# Patient Record
Sex: MALE | Race: WHITE | Employment: OTHER | URBAN - METROPOLITAN AREA
[De-identification: names, ages, dates, MRNs, and addresses within clinical notes are randomized per-mention and may not be internally consistent; named-entity substitution may affect disease eponyms.]

---

## 2020-04-28 ENCOUNTER — HOSPITAL ENCOUNTER (OUTPATIENT)
Dept: LAB | Age: 85
Discharge: HOME | End: 2020-04-28
Payer: MEDICARE

## 2020-04-28 LAB — SENTARA SPECIMEN COL,SENBCF: NORMAL

## 2020-04-28 PROCEDURE — 99001 SPECIMEN HANDLING PT-LAB: CPT

## 2021-03-15 ENCOUNTER — APPOINTMENT (OUTPATIENT)
Dept: GENERAL RADIOLOGY | Age: 86
End: 2021-03-15
Attending: PHYSICIAN ASSISTANT
Payer: MEDICARE

## 2021-03-15 ENCOUNTER — HOSPITAL ENCOUNTER (EMERGENCY)
Age: 86
Discharge: HOME | End: 2021-03-15
Attending: EMERGENCY MEDICINE
Payer: MEDICARE

## 2021-03-15 VITALS
HEIGHT: 70 IN | SYSTOLIC BLOOD PRESSURE: 184 MMHG | HEART RATE: 70 BPM | TEMPERATURE: 97.7 F | RESPIRATION RATE: 18 BRPM | WEIGHT: 162 LBS | DIASTOLIC BLOOD PRESSURE: 82 MMHG | OXYGEN SATURATION: 99 % | BODY MASS INDEX: 23.19 KG/M2

## 2021-03-15 DIAGNOSIS — G89.29 CHRONIC DENTAL PAIN: Primary | ICD-10-CM

## 2021-03-15 DIAGNOSIS — N30.00 ACUTE CYSTITIS WITHOUT HEMATURIA: ICD-10-CM

## 2021-03-15 DIAGNOSIS — K08.9 CHRONIC DENTAL PAIN: Primary | ICD-10-CM

## 2021-03-15 DIAGNOSIS — N18.9 CHRONIC KIDNEY DISEASE, UNSPECIFIED CKD STAGE: ICD-10-CM

## 2021-03-15 LAB
ALBUMIN SERPL-MCNC: 3.9 G/DL (ref 3.4–5)
ALBUMIN/GLOB SERPL: 1.1 {RATIO} (ref 0.8–1.7)
ALP SERPL-CCNC: 58 U/L (ref 45–117)
ALT SERPL-CCNC: 17 U/L (ref 16–61)
ANION GAP SERPL CALC-SCNC: 7 MMOL/L (ref 3–18)
APPEARANCE UR: ABNORMAL
AST SERPL-CCNC: 13 U/L (ref 10–38)
BACTERIA URNS QL MICRO: ABNORMAL /HPF
BASOPHILS # BLD: 0 K/UL (ref 0–0.1)
BASOPHILS NFR BLD: 0 % (ref 0–2)
BILIRUB SERPL-MCNC: 0.4 MG/DL (ref 0.2–1)
BILIRUB UR QL: NEGATIVE
BUN SERPL-MCNC: 53 MG/DL (ref 7–18)
BUN/CREAT SERPL: 22 (ref 12–20)
CALCIUM SERPL-MCNC: 9 MG/DL (ref 8.5–10.1)
CHLORIDE SERPL-SCNC: 105 MMOL/L (ref 100–111)
CK MB CFR SERPL CALC: 6.3 % (ref 0–4)
CK MB SERPL-MCNC: 3.4 NG/ML (ref 5–25)
CK SERPL-CCNC: 54 U/L (ref 39–308)
CO2 SERPL-SCNC: 27 MMOL/L (ref 21–32)
COLOR UR: YELLOW
CREAT SERPL-MCNC: 2.37 MG/DL (ref 0.6–1.3)
DIFFERENTIAL METHOD BLD: ABNORMAL
EOSINOPHIL # BLD: 0.1 K/UL (ref 0–0.4)
EOSINOPHIL NFR BLD: 2 % (ref 0–5)
EPITH CASTS URNS QL MICRO: ABNORMAL /LPF (ref 0–5)
ERYTHROCYTE [DISTWIDTH] IN BLOOD BY AUTOMATED COUNT: 13.6 % (ref 11.6–14.5)
GLOBULIN SER CALC-MCNC: 3.4 G/DL (ref 2–4)
GLUCOSE SERPL-MCNC: 104 MG/DL (ref 74–99)
GLUCOSE UR STRIP.AUTO-MCNC: NEGATIVE MG/DL
HCT VFR BLD AUTO: 37.5 % (ref 36–48)
HGB BLD-MCNC: 12.2 G/DL (ref 13–16)
HGB UR QL STRIP: ABNORMAL
KETONES UR QL STRIP.AUTO: NEGATIVE MG/DL
LEUKOCYTE ESTERASE UR QL STRIP.AUTO: ABNORMAL
LIPASE SERPL-CCNC: 155 U/L (ref 73–393)
LYMPHOCYTES # BLD: 1.6 K/UL (ref 0.9–3.6)
LYMPHOCYTES NFR BLD: 29 % (ref 21–52)
MCH RBC QN AUTO: 32.5 PG (ref 24–34)
MCHC RBC AUTO-ENTMCNC: 32.5 G/DL (ref 31–37)
MCV RBC AUTO: 100 FL (ref 74–97)
MONOCYTES # BLD: 0.6 K/UL (ref 0.05–1.2)
MONOCYTES NFR BLD: 11 % (ref 3–10)
NEUTS SEG # BLD: 3.3 K/UL (ref 1.8–8)
NEUTS SEG NFR BLD: 58 % (ref 40–73)
NITRITE UR QL STRIP.AUTO: NEGATIVE
PH UR STRIP: 8 [PH] (ref 5–8)
PLATELET # BLD AUTO: 248 K/UL (ref 135–420)
PMV BLD AUTO: 9.9 FL (ref 9.2–11.8)
POTASSIUM SERPL-SCNC: 4 MMOL/L (ref 3.5–5.5)
PROT SERPL-MCNC: 7.3 G/DL (ref 6.4–8.2)
PROT UR STRIP-MCNC: NEGATIVE MG/DL
RBC # BLD AUTO: 3.75 M/UL (ref 4.7–5.5)
RBC #/AREA URNS HPF: ABNORMAL /HPF (ref 0–5)
SODIUM SERPL-SCNC: 139 MMOL/L (ref 136–145)
SP GR UR REFRACTOMETRY: 1.01 (ref 1–1.03)
TRI-PHOS CRY URNS QL MICRO: ABNORMAL
TROPONIN I SERPL-MCNC: <0.02 NG/ML (ref 0–0.04)
UROBILINOGEN UR QL STRIP.AUTO: 0.2 EU/DL (ref 0.2–1)
WBC # BLD AUTO: 5.6 K/UL (ref 4.6–13.2)
WBC URNS QL MICRO: ABNORMAL /HPF (ref 0–5)

## 2021-03-15 PROCEDURE — 71101 X-RAY EXAM UNILAT RIBS/CHEST: CPT

## 2021-03-15 PROCEDURE — 74011250637 HC RX REV CODE- 250/637: Performed by: PHYSICIAN ASSISTANT

## 2021-03-15 PROCEDURE — 87086 URINE CULTURE/COLONY COUNT: CPT

## 2021-03-15 PROCEDURE — 93005 ELECTROCARDIOGRAM TRACING: CPT

## 2021-03-15 PROCEDURE — 81001 URINALYSIS AUTO W/SCOPE: CPT

## 2021-03-15 PROCEDURE — 96360 HYDRATION IV INFUSION INIT: CPT

## 2021-03-15 PROCEDURE — 82553 CREATINE MB FRACTION: CPT

## 2021-03-15 PROCEDURE — 87077 CULTURE AEROBIC IDENTIFY: CPT

## 2021-03-15 PROCEDURE — 99284 EMERGENCY DEPT VISIT MOD MDM: CPT

## 2021-03-15 PROCEDURE — 80053 COMPREHEN METABOLIC PANEL: CPT

## 2021-03-15 PROCEDURE — 83690 ASSAY OF LIPASE: CPT

## 2021-03-15 PROCEDURE — 87186 SC STD MICRODIL/AGAR DIL: CPT

## 2021-03-15 PROCEDURE — 85025 COMPLETE CBC W/AUTO DIFF WBC: CPT

## 2021-03-15 PROCEDURE — 74011250636 HC RX REV CODE- 250/636: Performed by: PHYSICIAN ASSISTANT

## 2021-03-15 RX ORDER — DOXYCYCLINE HYCLATE 100 MG
100 TABLET ORAL 2 TIMES DAILY
Qty: 28 TAB | Refills: 0 | Status: SHIPPED | OUTPATIENT
Start: 2021-03-15 | End: 2021-03-29

## 2021-03-15 RX ORDER — ACETAMINOPHEN 500 MG
1000 TABLET ORAL
Status: COMPLETED | OUTPATIENT
Start: 2021-03-15 | End: 2021-03-15

## 2021-03-15 RX ADMIN — ACETAMINOPHEN 1000 MG: 500 TABLET, FILM COATED ORAL at 15:05

## 2021-03-15 RX ADMIN — SODIUM CHLORIDE 500 ML: 900 INJECTION, SOLUTION INTRAVENOUS at 16:26

## 2021-03-15 NOTE — ED PROVIDER NOTES
EMERGENCY DEPARTMENT HISTORY AND PHYSICAL EXAM    Date: 3/15/2021  Patient Name: Hector Kenney    History of Presenting Illness     Chief Complaint   Patient presents with    Dental Pain         History Provided By: Patient    Chief Complaint: dental pain    HPI(Context):   1:47 PM  Hector Kenney is a 80 y.o. male with PMHX of HTN, PVD, pacemaker, on Eliquis, who presents to the emergency department C/O right sided dental/mouth pain. Sxs x since may 2020. Pt state reports \"skin\" in his mouth with right upper dental pain. Pt attempted to see PCP today but no appts available. Pt state he has seen multiple dentists, ENT, and periodontist with no formal dx. PT had CT face ordered by PCP which showed no acute process. Pt reports hx of bridge and dental implant >20 years ago. Wife expresses concern for pt as he complains about his dental pain regularly. Pt reports no relief with various Rx mouth washes. Pt denies fever, chills, chest pain, nausea, vomiting, abdominal pain, and any other sxs or complaints. Pt is nonsmoker. PCP: Santos Sweet MD    Current Outpatient Medications   Medication Sig Dispense Refill    doxycycline (VIBRA-TABS) 100 mg tablet Take 1 Tab by mouth two (2) times a day for 14 days. Indications: inflammation of the tissue lining the sinuses 28 Tab 0    linezolid (ZYVOX) 600 mg tablet Take 1 Tab by mouth two (2) times a day. 14 Tab 0    tamsulosin (FLOMAX) 0.4 mg capsule Take 1 Cap by mouth daily. 90 Cap 3    Cetirizine 10 mg cap Take  by mouth.  ELIQUIS 2.5 mg tablet   5    felodipine (PLENDIL SR) 10 mg 24 hr tablet Take 10 mg by mouth daily.  aspirin 81 mg chewable tablet Take 81 mg by mouth daily.  FLUVASTATIN SODIUM (FLUVASTATIN PO) Take  by mouth.  metoprolol succinate (TOPROL-XL) 100 mg XL tablet Take 100 mg by mouth daily.  furosemide (LASIX) 20 mg tablet Take  by mouth daily.  ezetimibe (ZETIA) 10 mg tablet Take  by mouth.       isosorbide mononitrate ER (IMDUR) 60 mg CR tablet Take  by mouth every morning.  nitroglycerin (NITROSTAT) 0.3 mg SL tablet by SubLINGual route every five (5) minutes as needed.  ALPRAZolam (XANAX) 0.5 mg tablet Take  by mouth.  omega-3 fatty acids-vitamin e (FISH OIL) 1,000 mg Cap Take 1 Cap by mouth. Past History     Past Medical History:  Past Medical History:   Diagnosis Date    Burning with urination     Cataract     Essential hypertension     Heart abnormalities     Heart block     Peripheral vascular disease (HCC)     Stented coronary artery     Unspecified cerebral artery occlusion with cerebral infarction        Past Surgical History:  Past Surgical History:   Procedure Laterality Date    HX CAROTID ENDARTERECTOMY      HX CATARACT REMOVAL      HX CORONARY STENT PLACEMENT      HX ORTHOPAEDIC      HX OTHER SURGICAL      liver biopsy    HX PACEMAKER         Family History:  Family History   Problem Relation Age of Onset    Cancer Father        Social History:  Social History     Tobacco Use    Smoking status: Former Smoker     Quit date: 1991     Years since quittin.2    Smokeless tobacco: Never Used   Substance Use Topics    Alcohol use: Yes     Comment: rarely    Drug use: No       Allergies: Allergies   Allergen Reactions    Cefuroxime Axetil Rash    Ciprofloxacin Unknown (comments)    Dicloxacillin Rash    Statins-Hmg-Coa Reductase Inhibitors Myalgia         Review of Systems   Review of Systems   Constitutional: Negative for chills and fever. HENT: Positive for congestion, dental problem and sinus pain. Respiratory: Negative for shortness of breath. Gastrointestinal: Negative for abdominal pain, nausea and vomiting. Genitourinary: Negative for dysuria. Neurological: Negative for headaches. All other systems reviewed and are negative.       Physical Exam     Vitals:    03/15/21 1335   BP: (!) 184/82   Pulse: 70   Resp: 18   Temp: 97.7 °F (36.5 °C)   SpO2: 99%   Weight: 73.5 kg (162 lb)   Height: 5' 10\" (1.778 m)     Physical Exam  Vitals signs and nursing note reviewed. Constitutional:       General: He is not in acute distress. Appearance: He is well-developed. He is not diaphoretic. Comments: Geriatric  male in NAD. Alert. Wife at bedside. HENT:      Head: Normocephalic and atraumatic. No right periorbital erythema or left periorbital erythema. Jaw: No trismus. Right Ear: External ear normal. No drainage or swelling. Tympanic membrane is not perforated, erythematous or bulging. Left Ear: External ear normal. No drainage or swelling. Tympanic membrane is not perforated, erythematous or bulging. Nose: Congestion present. No rhinorrhea. Right Sinus: Maxillary sinus tenderness present. No frontal sinus tenderness. Left Sinus: Maxillary sinus tenderness present. No frontal sinus tenderness. Mouth/Throat:      Mouth: Mucous membranes are dry. No oral lesions. Dentition: Dental tenderness and dental caries present. No gingival swelling, dental abscesses or gum lesions. Palate: No mass and lesions. Pharynx: Uvula midline. No oropharyngeal exudate, posterior oropharyngeal erythema or uvula swelling. Tonsils: No tonsillar abscesses. Eyes:      General: No scleral icterus. Right eye: No discharge. Left eye: No discharge. Conjunctiva/sclera: Conjunctivae normal.   Neck:      Musculoskeletal: Normal range of motion and neck supple. Cardiovascular:      Rate and Rhythm: Normal rate and regular rhythm. Heart sounds: Normal heart sounds. No murmur. No friction rub. No gallop. Pulmonary:      Effort: Pulmonary effort is normal. No tachypnea, accessory muscle usage or respiratory distress. Breath sounds: Normal breath sounds. No decreased breath sounds, wheezing, rhonchi or rales. Abdominal:      General: There is no distension.       Palpations: Abdomen is soft. Tenderness: There is no abdominal tenderness. Musculoskeletal: Normal range of motion. General: No tenderness. Right lower leg: Edema (1+) present. Left lower leg: Edema (1+) present. Lymphadenopathy:      Cervical: No cervical adenopathy. Skin:     General: Skin is warm and dry. Neurological:      Mental Status: He is alert and oriented to person, place, and time. Mental status is at baseline. Psychiatric:         Behavior: Behavior normal.         Judgment: Judgment normal.             Diagnostic Study Results     Labs -     Recent Results (from the past 12 hour(s))   EKG, 12 LEAD, INITIAL    Collection Time: 03/15/21  3:11 PM   Result Value Ref Range    Ventricular Rate 72 BPM    Atrial Rate 72 BPM    P-R Interval 224 ms    QRS Duration 188 ms    Q-T Interval 478 ms    QTC Calculation (Bezet) 523 ms    Calculated R Axis -76 degrees    Calculated T Axis 97 degrees    Diagnosis       Electronic ventricular pacemaker  When compared with ECG of 01-MAY-2013 02:15,  Electronic ventricular pacemaker has replaced Sinus rhythm     CBC WITH AUTOMATED DIFF    Collection Time: 03/15/21  3:13 PM   Result Value Ref Range    WBC 5.6 4.6 - 13.2 K/uL    RBC 3.75 (L) 4.70 - 5.50 M/uL    HGB 12.2 (L) 13.0 - 16.0 g/dL    HCT 37.5 36.0 - 48.0 %    .0 (H) 74.0 - 97.0 FL    MCH 32.5 24.0 - 34.0 PG    MCHC 32.5 31.0 - 37.0 g/dL    RDW 13.6 11.6 - 14.5 %    PLATELET 823 609 - 832 K/uL    MPV 9.9 9.2 - 11.8 FL    NEUTROPHILS 58 40 - 73 %    LYMPHOCYTES 29 21 - 52 %    MONOCYTES 11 (H) 3 - 10 %    EOSINOPHILS 2 0 - 5 %    BASOPHILS 0 0 - 2 %    ABS. NEUTROPHILS 3.3 1.8 - 8.0 K/UL    ABS. LYMPHOCYTES 1.6 0.9 - 3.6 K/UL    ABS. MONOCYTES 0.6 0.05 - 1.2 K/UL    ABS. EOSINOPHILS 0.1 0.0 - 0.4 K/UL    ABS.  BASOPHILS 0.0 0.0 - 0.1 K/UL    DF AUTOMATED     METABOLIC PANEL, COMPREHENSIVE    Collection Time: 03/15/21  3:13 PM   Result Value Ref Range    Sodium 139 136 - 145 mmol/L    Potassium 4.0 3.5 - 5.5 mmol/L    Chloride 105 100 - 111 mmol/L    CO2 27 21 - 32 mmol/L    Anion gap 7 3.0 - 18 mmol/L    Glucose 104 (H) 74 - 99 mg/dL    BUN 53 (H) 7.0 - 18 MG/DL    Creatinine 2.37 (H) 0.6 - 1.3 MG/DL    BUN/Creatinine ratio 22 (H) 12 - 20      GFR est AA 31 (L) >60 ml/min/1.73m2    GFR est non-AA 26 (L) >60 ml/min/1.73m2    Calcium 9.0 8.5 - 10.1 MG/DL    Bilirubin, total 0.4 0.2 - 1.0 MG/DL    ALT (SGPT) 17 16 - 61 U/L    AST (SGOT) 13 10 - 38 U/L    Alk. phosphatase 58 45 - 117 U/L    Protein, total 7.3 6.4 - 8.2 g/dL    Albumin 3.9 3.4 - 5.0 g/dL    Globulin 3.4 2.0 - 4.0 g/dL    A-G Ratio 1.1 0.8 - 1.7     CARDIAC PANEL,(CK, CKMB & TROPONIN)    Collection Time: 03/15/21  3:13 PM   Result Value Ref Range    CK - MB 3.4 <3.6 ng/ml    CK-MB Index 6.3 (H) 0.0 - 4.0 %    CK 54 39 - 308 U/L    Troponin-I, QT <0.02 0.0 - 0.045 NG/ML   LIPASE    Collection Time: 03/15/21  3:13 PM   Result Value Ref Range    Lipase 155 73 - 393 U/L   URINALYSIS W/ RFLX MICROSCOPIC    Collection Time: 03/15/21  3:39 PM   Result Value Ref Range    Color YELLOW      Appearance CLOUDY      Specific gravity 1.008 1.005 - 1.030      pH (UA) 8.0 5.0 - 8.0      Protein Negative NEG mg/dL    Glucose Negative NEG mg/dL    Ketone Negative NEG mg/dL    Bilirubin Negative NEG      Blood TRACE (A) NEG      Urobilinogen 0.2 0.2 - 1.0 EU/dL    Nitrites Negative NEG      Leukocyte Esterase LARGE (A) NEG     URINE MICROSCOPIC ONLY    Collection Time: 03/15/21  3:39 PM   Result Value Ref Range    WBC 21 to 35 0 - 5 /hpf    RBC 0 to 3 0 - 5 /hpf    Epithelial cells FEW 0 - 5 /lpf    Bacteria 3+ (A) NEG /hpf    Triple Phosphate crystals FEW (A) NEG         XR RIBS LT W PA CXR MIN 3 V   Final Result         1. No acute radiographic cardiopulmonary abnormality. 2. No displaced left-sided rib fracture.         CT Results  (Last 48 hours)    None        CXR Results  (Last 48 hours)    None          Medications given in the ED-  Medications acetaminophen (TYLENOL) tablet 1,000 mg (1,000 mg Oral Given 3/15/21 6775)   sodium chloride 0.9 % bolus infusion 500 mL (0 mL IntraVENous IV Completed 3/15/21 1726)         Medical Decision Making   I am the first provider for this patient. I reviewed the vital signs, available nursing notes, past medical history, past surgical history, family history and social history. Vital Signs-Reviewed the patient's vital signs. Pulse Oximetry Analysis - 99% on RA. NORMAL     Records Reviewed: Nursing Notes, Old Medical Records, Previous Radiology Studies and Previous Laboratory Studies    Provider Notes (Medical Decision Making): dental caries, implant/bridge problem, malignancy, chronic pain    Procedures:  Procedures    ED Course:   1:47 PM Initial assessment performed. The patients presenting problems have been discussed, and they are in agreement with the care plan formulated and outlined with them. I have encouraged them to ask questions as they arise throughout their visit. Diagnosis and Disposition       Workup reassuring today. CKD with mildly worsening today but not far from baseline. Gave small volume IVF in ED. UA c/w UTI with hx of same. Most recent urine cultures reveal Enterococcus. As pt has numerous allergies and CKD, discussed ABX choice with attending. Will place on doxy and await urine culture. No evidence of upper urinary tract involvement. No abdominal pain complaints. Pt endorses sinus pressure and congestion so possible dental pain due to sinusitis which doxy will cover. Will give oral surgery FU as pt has dental bridge/implant and he expresses concern for problem with his dental device. Reviewed case and labs with attending    1. Chronic dental pain    2. Acute cystitis without hematuria    3. Chronic kidney disease, unspecified CKD stage        PLAN:  1. D/C Home  2.    Discharge Medication List as of 3/15/2021  5:23 PM      START taking these medications    Details   doxycycline (VIBRA-TABS) 100 mg tablet Take 1 Tab by mouth two (2) times a day for 14 days. Indications: inflammation of the tissue lining the sinuses, Normal, Disp-28 Tab, R-0         CONTINUE these medications which have NOT CHANGED    Details   linezolid (ZYVOX) 600 mg tablet Take 1 Tab by mouth two (2) times a day., Print, Disp-14 Tab, R-0      tamsulosin (FLOMAX) 0.4 mg capsule Take 1 Cap by mouth daily. , Normal, Disp-90 Cap, R-3      Cetirizine 10 mg cap Take  by mouth., Historical Med      ELIQUIS 2.5 mg tablet Historical Med, R-5      felodipine (PLENDIL SR) 10 mg 24 hr tablet Take 10 mg by mouth daily. , Historical Med      aspirin 81 mg chewable tablet Take 81 mg by mouth daily. , Historical Med      FLUVASTATIN SODIUM (FLUVASTATIN PO) Take  by mouth., Historical Med      metoprolol succinate (TOPROL-XL) 100 mg XL tablet Take 100 mg by mouth daily. , Historical Med      furosemide (LASIX) 20 mg tablet Take  by mouth daily. , Historical Med      ezetimibe (ZETIA) 10 mg tablet Take  by mouth., Historical Med      isosorbide mononitrate ER (IMDUR) 60 mg CR tablet Take  by mouth every morning., Historical Med      nitroglycerin (NITROSTAT) 0.3 mg SL tablet by SubLINGual route every five (5) minutes as needed.  , Historical Med      ALPRAZolam (XANAX) 0.5 mg tablet Take  by mouth.  , Historical Med      omega-3 fatty acids-vitamin e (FISH OIL) 1,000 mg Cap Take 1 Cap by mouth.  , Historical Med           3. Follow-up Information     Follow up With Specialties Details Why Contact Info    Willem Peña MD Oral Surgery  please follow up with oral surgery [address]  [phone]      Denise Christian MD Internal Medicine   [address] [place] [address] [address] [facility]      [facility] EMERGENCY DEPT Emergency Medicine   [address] [facility] [address]  [phone]        _______________________________    Attestations:   This note is prepared by Estevan Ybarra BRENNA.      Please note that this dictation was completed with [employer] MD, the computer voice recognition software. Quite often unanticipated grammatical, syntax, homophones, and other interpretive errors are inadvertently transcribed by the computer software. Please disregard these errors. Please excuse any errors that have escaped final proofreading.

## 2021-03-15 NOTE — ED TRIAGE NOTES
Patient reports problems with his gums. States he's been to 5 dentists and his doctor and noone listens.

## 2021-03-18 LAB
ATRIAL RATE: 72 BPM
BACTERIA SPEC CULT: ABNORMAL
CALCULATED R AXIS, ECG10: -76 DEGREES
CALCULATED T AXIS, ECG11: 97 DEGREES
CC UR VC: ABNORMAL
DIAGNOSIS, 93000: NORMAL
P-R INTERVAL, ECG05: 224 MS
Q-T INTERVAL, ECG07: 478 MS
QRS DURATION, ECG06: 188 MS
QTC CALCULATION (BEZET), ECG08: 523 MS
SERVICE CMNT-IMP: ABNORMAL
VENTRICULAR RATE, ECG03: 72 BPM

## 2021-03-19 RX ORDER — AMPICILLIN 500 MG/1
500 CAPSULE ORAL
Qty: 20 CAP | Refills: 0 | Status: SHIPPED | OUTPATIENT
Start: 2021-03-19 | End: 2021-03-24

## 2021-03-19 RX ORDER — SULFAMETHOXAZOLE AND TRIMETHOPRIM 800; 160 MG/1; MG/1
1 TABLET ORAL 2 TIMES DAILY
Qty: 6 TAB | Refills: 0 | Status: SHIPPED | OUTPATIENT
Start: 2021-03-19 | End: 2021-03-19 | Stop reason: CLARIF

## 2021-03-19 NOTE — CALL BACK NOTE
4:06 PM 
03/19/21 Pt Returned call. Discussed urine culture results. Patient states he has had penicillin in the past and had no reaction. Will send prescription for ampicillin and have patient follow-up with his doctor. no renal adjustment needed Yesi Fernandes PA-C

## 2021-03-19 NOTE — CALL BACK NOTE
3:54 PM 
03/19/21 Attempted to contact patient regarding urine culture results. No answer. Message left on machine to return call.   
 
 
Daine Severe, PA-C

## 2022-09-18 ENCOUNTER — APPOINTMENT (OUTPATIENT)
Dept: GENERAL RADIOLOGY | Age: 87
End: 2022-09-18
Attending: EMERGENCY MEDICINE
Payer: MEDICARE

## 2022-09-18 ENCOUNTER — HOSPITAL ENCOUNTER (EMERGENCY)
Age: 87
Discharge: HOME | End: 2022-09-18
Attending: EMERGENCY MEDICINE
Payer: MEDICARE

## 2022-09-18 VITALS
SYSTOLIC BLOOD PRESSURE: 151 MMHG | TEMPERATURE: 97.8 F | HEART RATE: 65 BPM | OXYGEN SATURATION: 100 % | WEIGHT: 135 LBS | BODY MASS INDEX: 19.33 KG/M2 | DIASTOLIC BLOOD PRESSURE: 62 MMHG | RESPIRATION RATE: 17 BRPM | HEIGHT: 70 IN

## 2022-09-18 DIAGNOSIS — S51.802A AVULSION OF SKIN OF LEFT FOREARM, INITIAL ENCOUNTER: Primary | ICD-10-CM

## 2022-09-18 DIAGNOSIS — S52.225A CLOSED NONDISPLACED TRANSVERSE FRACTURE OF SHAFT OF LEFT ULNA, INITIAL ENCOUNTER: ICD-10-CM

## 2022-09-18 PROCEDURE — 99284 EMERGENCY DEPT VISIT MOD MDM: CPT

## 2022-09-18 PROCEDURE — 90715 TDAP VACCINE 7 YRS/> IM: CPT | Performed by: EMERGENCY MEDICINE

## 2022-09-18 PROCEDURE — 90471 IMMUNIZATION ADMIN: CPT

## 2022-09-18 PROCEDURE — 73090 X-RAY EXAM OF FOREARM: CPT

## 2022-09-18 PROCEDURE — 74011250636 HC RX REV CODE- 250/636: Performed by: EMERGENCY MEDICINE

## 2022-09-18 RX ADMIN — TETANUS TOXOID, REDUCED DIPHTHERIA TOXOID AND ACELLULAR PERTUSSIS VACCINE, ADSORBED 0.5 ML: 5; 2.5; 8; 8; 2.5 SUSPENSION INTRAMUSCULAR at 09:36

## 2022-09-18 NOTE — ED TRIAGE NOTES
Pt brought in by EMS states \" I  fell this morning and I hit my left arm and have a 10 cm gapping skin tear  to the lower arm. \"

## 2022-09-18 NOTE — ED PROVIDER NOTES
EMERGENCY DEPARTMENT HISTORY AND PHYSICAL EXAM    Date: 9/18/2022  Patient Name: Ama Bauer    History of Presenting Illness     Chief Complaint   Patient presents with    Fall       History Provided By: Patient     History Zari Pompa):   9:24 AM  Ama Bauer is a 80 y.o. male with a PMHX of Hypertension, heart block, CAD, CVA  who presents to the emergency department (room 3) by EMS C/O left arm pain onset this morning. Associated sxs include nonsyncopal fall, arm laceration. Pt denies any other sxs or complaints. Patient states that he was getting up from his recliner today when he tripped falling onto the living room table. Patient denies any head injury or loss of consciousness. He had no preceding chest pain or shortness of breath. He has left arm pain and a left arm laceration that his wife dressed with a bandage. Chief Complaint: Left arm pain  Onset: This morning  Timing:  Acute  Context:  Nonsyncopal fall brought symptoms on, symptoms have rapidly worsened since onset  Location: Left forearm  Quality: Sharp  Severity: Moderate  Modifying Factors: Nothing makes it better, or worse.   Associated Symptoms:  Left arm skin tear, nonsyncopal fall    PCP: Milly Espinal MD     Past History         Past Medical History:  Past Medical History:   Diagnosis Date    Burning with urination     Cataract     Essential hypertension     Heart abnormalities     Heart block     Peripheral vascular disease ([address]     Stented coronary artery     Unspecified cerebral artery occlusion with cerebral infarction        Past Surgical History:  Past Surgical History:   Procedure Laterality Date    HX CAROTID ENDARTERECTOMY      HX CATARACT REMOVAL      HX CORONARY STENT PLACEMENT      HX ORTHOPAEDIC      HX OTHER SURGICAL      liver biopsy    HX PACEMAKER         Family History:  Family History   Problem Relation Age of Onset    Cancer Father    Reviewed and non-contributory    Social History:  Social History Tobacco Use    Smoking status: Former     Types: Cigarettes     Quit date: 1991     Years since quittin.7    Smokeless tobacco: Never   Substance Use Topics    Alcohol use: Yes     Comment: rarely    Drug use: No       Medications:  Current Outpatient Medications   Medication Sig Dispense Refill    linezolid (ZYVOX) 600 mg tablet Take 1 Tab by mouth two (2) times a day. 14 Tab 0    tamsulosin (FLOMAX) 0.4 mg capsule Take 1 Cap by mouth daily. 90 Cap 3    Cetirizine 10 mg cap Take  by mouth. ELIQUIS 2.5 mg tablet   5    felodipine (PLENDIL SR) 10 mg 24 hr tablet Take 10 mg by mouth daily. aspirin 81 mg chewable tablet Take 81 mg by mouth daily. FLUVASTATIN SODIUM (FLUVASTATIN PO) Take  by mouth.      metoprolol succinate (TOPROL-XL) 100 mg XL tablet Take 100 mg by mouth daily. furosemide (LASIX) 20 mg tablet Take  by mouth daily. ezetimibe (ZETIA) 10 mg tablet Take  by mouth.      isosorbide mononitrate ER (IMDUR) 60 mg CR tablet Take  by mouth every morning. nitroglycerin (NITROSTAT) 0.3 mg SL tablet by SubLINGual route every five (5) minutes as needed. ALPRAZolam (XANAX) 0.5 mg tablet Take  by mouth. omega-3 fatty acids-vitamin e (FISH OIL) 1,000 mg Cap Take 1 Cap by mouth. Allergies: Allergies   Allergen Reactions    Cefuroxime Axetil Rash    Ciprofloxacin Unknown (comments)    Dicloxacillin Rash    Statins-Hmg-Coa Reductase Inhibitors Myalgia       Review of Systems      Review of Systems   Constitutional:  Negative for chills and fever. HENT:  Negative for rhinorrhea and sore throat. Eyes:  Negative for pain and visual disturbance. Respiratory:  Negative for chest tightness, shortness of breath and wheezing. Cardiovascular:  Negative for chest pain and palpitations. Gastrointestinal:  Negative for abdominal pain, diarrhea, nausea and vomiting. Musculoskeletal:  Negative for arthralgias and myalgias. Skin:  Positive for wound. Negative for rash. Neurological:  Negative for speech difficulty, light-headedness and headaches. Psychiatric/Behavioral:  Negative for agitation and confusion. All other systems reviewed and are negative. Physical Exam     Vitals:    09/18/22 0932 09/18/22 1033   BP: 136/64 (!) 151/62   Pulse: 65 65   Resp: 18 17   Temp: 97.8 °F (36.6 °C)    SpO2: 98% 100%   Weight: 61.2 kg (135 lb)    Height: 5' 10\" (1.778 m)        Physical Exam  Vitals and nursing note reviewed. Constitutional:       General: He is not in acute distress. Appearance: Normal appearance. He is normal weight. He is not ill-appearing. HENT:      Head: Normocephalic and atraumatic. Nose: Nose normal. No rhinorrhea. Mouth/Throat:      Mouth: Mucous membranes are moist.      Pharynx: No oropharyngeal exudate or posterior oropharyngeal erythema. Eyes:      Extraocular Movements: Extraocular movements intact. Conjunctiva/sclera: Conjunctivae normal.      Pupils: Pupils are equal, round, and reactive to light. Cardiovascular:      Rate and Rhythm: Normal rate and regular rhythm. Heart sounds: No murmur heard. No friction rub. No gallop. Pulmonary:      Effort: Pulmonary effort is normal. No respiratory distress. Breath sounds: Normal breath sounds. No wheezing, rhonchi or rales. Abdominal:      General: Bowel sounds are normal.      Palpations: Abdomen is soft. Tenderness: There is no abdominal tenderness. There is no guarding or rebound. Musculoskeletal:         General: No swelling or deformity. Normal range of motion. Right forearm: Normal.      Left forearm: Laceration, tenderness and bony tenderness present. No swelling, edema or deformity. Cervical back: Normal range of motion and neck supple. No rigidity. Lymphadenopathy:      Cervical: No cervical adenopathy. Skin:     General: Skin is warm and dry. Findings: No rash.           Neurological:      General: No focal deficit present. Mental Status: He is alert and oriented to person, place, and time. Psychiatric:         Mood and Affect: Mood normal.         Behavior: Behavior normal.       Diagnostic Study Results     Labs -  No results found for this or any previous visit (from the past 12 hour(s)). Radiologic Studies -     10:18 AM  RADIOLOGY FINDINGS  Left forearm x-ray shows acute fracture of the ulna, no other fractures or dislocation identified. Pending review by Radiologist  Recorded by Julián Vasquez MD.    XR FOREARM LT AP/LAT   Final Result      Obliquely oriented extra-articular fracture of the distal left ulna with mild   displacement. CT Results  (Last 48 hours)      None          CXR Results  (Last 48 hours)      None            Medications given in the ED-  Medications   diph,Pertuss(AC),Tet Vac-PF (BOOSTRIX) suspension 0.5 mL (0.5 mL IntraMUSCular Given 9/18/22 0936)       Procedures     Wound Repair    Date/Time: 9/18/2022 10:50 AM  Performed by: attendingPre-procedure re-eval: Immediately prior to the procedure, the patient was reevaluated and found suitable for the planned procedure and any planned medications. Time out: Immediately prior to the procedure a time out was called to verify the correct patient, procedure, equipment, staff and marking as appropriate. .  Location details: left arm  Wound length:7.6 - 12.5 cm  Foreign bodies: no foreign bodies  Irrigation solution: saline  Debridement: none  Skin closure: Steri-Strips (9)  Approximation: close  Dressing: non-adhesive packing strip  Patient tolerance: patient tolerated the procedure well with no immediate complications  My total time at bedside, performing this procedure was 1-15 minutes. Comments: Wound cleaned with normal saline and 4 x 4's, dressed with Telfa and Coban. Splinted over top of the dressing due to fracture. ED Course     I Julián Vasquez MD) am the first provider for this patient.   I reviewed the vital signs, available nursing notes, past medical history, past surgical history, family history and social history. Records Reviewed: Nursing Notes    Cardiac Monitor:  Rate: 65 bpm  Rhythm: sinus rhythm    Pulse Oximetry Analysis - 100% on RA      9:24 AM Initial assessment performed. The patients presenting problems have been discussed, and they are in agreement with the care plan formulated and outlined with them. I have encouraged them to ask questions as they arise throughout their visit. ED Course as of 09/18/22 1213   Sun Sep 18, 2022   1035 9 Steri-Strips applied with Mastisol after cleaning the wound with saline and gauze. Wound dressed with Telfa and Covan. [JM]   4490 Splint applied by ED RN, Lio Santiago, assessed by me, ruth NVI. [JM]      ED Course User Index  [JM] Segundo Man MD         Medical Decision Making     Provider Notes (Medical Decision Making):   DDX: Sprain, strain, fracture, dislocation, abrasion, laceration, avulsion    Discussion:  80 y.o. male with skin tear of the left arm and ulna fracture after a nonsyncopal fall. Patient only has pain when he is experiencing manipulation of the bone. Skin tear was cleaned and dressed and Steri-Stripped. Fracture was splinted and a sling was applied in the ED. Patient will require follow-up with orthopedics and his primary care doctor. Patient understands and agrees with this plan. Diagnosis and Disposition     DISCHARGE NOTE:  11:01 AM   Giorgi Mock's  results have been reviewed with him. He has been counseled regarding his diagnosis, treatment, and plan. He verbally conveys understanding and agreement of the signs, symptoms, diagnosis, treatment and prognosis and additionally agrees to follow up as discussed. He also agrees with the care-plan and conveys that all of his questions have been answered.   I have also provided discharge instructions for him that include: educational information regarding their diagnosis and treatment, and list of reasons why they would want to return to the ED prior to their follow-up appointment, should his condition change. He has been provided with education for proper emergency department utilization. CLINICAL IMPRESSION:    1. Avulsion of skin of left forearm, initial encounter    2. Closed nondisplaced transverse fracture of shaft of left ulna, initial encounter        PLAN:  1. D/C Home  2. Current Discharge Medication List        3. Follow-up Information       Follow up With Specialties Details Why Contact Info    Attila Jones MD Internal Medicine Physician Schedule an appointment as soon as possible for a visit  As soon as possible, For follow up from Emergency Department visit. [address]  [address]      Tanisha Mathur MD Orthopedic Surgery Schedule an appointment as soon as possible for a visit  As soon as possible, For follow up from Emergency Department visit. [address]      [facility] EMERGENCY DEPT Emergency Medicine  As needed; If symptoms worsen [address] Radha Gutierrez [phone] [id number] [facility] am the primary clinician of record. [employer] Disclaimer     Please note that this dictation was completed with [employer], the computer voice recognition software. Quite often unanticipated grammatical, syntax, homophones, and other interpretive errors are inadvertently transcribed by the computer software. Please disregard these errors. Please excuse any errors that have escaped final proofreading.     Geovanni Odell MD

## 2022-10-25 ENCOUNTER — TRANSCRIBE ORDER (OUTPATIENT)
Dept: SCHEDULING | Age: 87
End: 2022-10-25

## 2022-10-25 DIAGNOSIS — M84.40XA: Primary | ICD-10-CM

## 2022-10-27 ENCOUNTER — HOSPITAL ENCOUNTER (OUTPATIENT)
Dept: CT IMAGING | Age: 87
Discharge: HOME | End: 2022-10-27
Attending: ORTHOPAEDIC SURGERY
Payer: MEDICARE

## 2022-10-27 DIAGNOSIS — M84.40XA: ICD-10-CM

## 2022-10-27 PROCEDURE — 73200 CT UPPER EXTREMITY W/O DYE: CPT
